# Patient Record
Sex: MALE | Race: WHITE | NOT HISPANIC OR LATINO | URBAN - METROPOLITAN AREA
[De-identification: names, ages, dates, MRNs, and addresses within clinical notes are randomized per-mention and may not be internally consistent; named-entity substitution may affect disease eponyms.]

---

## 2023-10-18 DIAGNOSIS — Z00.00 ROUTINE MEDICAL EXAM: Primary | ICD-10-CM

## 2023-11-10 ENCOUNTER — OFFICE VISIT (OUTPATIENT)
Dept: INTERNAL MEDICINE | Facility: CLINIC | Age: 23
End: 2023-11-10
Payer: COMMERCIAL

## 2023-11-10 ENCOUNTER — HOSPITAL ENCOUNTER (OUTPATIENT)
Dept: CARDIOLOGY | Facility: CLINIC | Age: 23
Discharge: HOME | End: 2023-11-10
Payer: COMMERCIAL

## 2023-11-10 ENCOUNTER — CLINICAL SUPPORT (OUTPATIENT)
Dept: INTERNAL MEDICINE | Facility: CLINIC | Age: 23
End: 2023-11-10
Payer: COMMERCIAL

## 2023-11-10 ENCOUNTER — HOSPITAL ENCOUNTER (OUTPATIENT)
Dept: RADIOLOGY | Facility: HOSPITAL | Age: 23
Discharge: HOME | End: 2023-11-10
Attending: STUDENT IN AN ORGANIZED HEALTH CARE EDUCATION/TRAINING PROGRAM
Payer: COMMERCIAL

## 2023-11-10 VITALS
WEIGHT: 141.56 LBS | SYSTOLIC BLOOD PRESSURE: 116 MMHG | OXYGEN SATURATION: 97 % | HEART RATE: 62 BPM | RESPIRATION RATE: 18 BRPM | DIASTOLIC BLOOD PRESSURE: 62 MMHG

## 2023-11-10 DIAGNOSIS — F41.1 GENERALIZED ANXIETY DISORDER: ICD-10-CM

## 2023-11-10 DIAGNOSIS — F41.9 ANXIETY: ICD-10-CM

## 2023-11-10 DIAGNOSIS — Z23 NEED FOR VACCINATION: ICD-10-CM

## 2023-11-10 DIAGNOSIS — Z00.00 ROUTINE MEDICAL EXAM: ICD-10-CM

## 2023-11-10 DIAGNOSIS — Z00.00 ANNUAL PHYSICAL EXAM: Primary | ICD-10-CM

## 2023-11-10 DIAGNOSIS — M41.9 SCOLIOSIS OF CERVICOTHORACIC SPINE, UNSPECIFIED SCOLIOSIS TYPE: ICD-10-CM

## 2023-11-10 DIAGNOSIS — Z00.00 HEALTH MAINTENANCE EXAMINATION: ICD-10-CM

## 2023-11-10 DIAGNOSIS — Z00.00 ROUTINE GENERAL MEDICAL EXAMINATION AT A HEALTH CARE FACILITY: Primary | ICD-10-CM

## 2023-11-10 LAB
ALBUMIN SERPL BCP-MCNC: 4.4 G/DL (ref 3.5–5.2)
ALP SERPL-CCNC: 64 U/L (ref 55–135)
ALT SERPL W/O P-5'-P-CCNC: 16 U/L (ref 10–44)
ANION GAP SERPL CALC-SCNC: 11 MMOL/L (ref 8–16)
AST SERPL-CCNC: 14 U/L (ref 10–40)
BILIRUB SERPL-MCNC: 0.9 MG/DL (ref 0.1–1)
BUN SERPL-MCNC: 23 MG/DL (ref 6–20)
CALCIUM SERPL-MCNC: 9.8 MG/DL (ref 8.7–10.5)
CHLORIDE SERPL-SCNC: 104 MMOL/L (ref 95–110)
CHOLEST SERPL-MCNC: 149 MG/DL (ref 120–199)
CHOLEST/HDLC SERPL: 2.5 {RATIO} (ref 2–5)
CO2 SERPL-SCNC: 25 MMOL/L (ref 23–29)
CREAT SERPL-MCNC: 1.2 MG/DL (ref 0.5–1.4)
ERYTHROCYTE [DISTWIDTH] IN BLOOD BY AUTOMATED COUNT: 11.4 % (ref 11.5–14.5)
EST. GFR  (NO RACE VARIABLE): >60 ML/MIN/1.73 M^2
ESTIMATED AVG GLUCOSE: 91 MG/DL (ref 68–131)
GLUCOSE SERPL-MCNC: 83 MG/DL (ref 70–110)
HBA1C MFR BLD: 4.8 % (ref 4–5.6)
HCT VFR BLD AUTO: 44.2 % (ref 40–54)
HCV AB SERPL QL IA: NORMAL
HDLC SERPL-MCNC: 60 MG/DL (ref 40–75)
HDLC SERPL: 40.3 % (ref 20–50)
HGB BLD-MCNC: 15.4 G/DL (ref 14–18)
HIV 1+2 AB+HIV1 P24 AG SERPL QL IA: NORMAL
LDLC SERPL CALC-MCNC: 76.2 MG/DL (ref 63–159)
MCH RBC QN AUTO: 32.2 PG (ref 27–31)
MCHC RBC AUTO-ENTMCNC: 34.8 G/DL (ref 32–36)
MCV RBC AUTO: 93 FL (ref 82–98)
NONHDLC SERPL-MCNC: 89 MG/DL
PLATELET # BLD AUTO: 279 K/UL (ref 150–450)
PMV BLD AUTO: 9.7 FL (ref 9.2–12.9)
POTASSIUM SERPL-SCNC: 4.3 MMOL/L (ref 3.5–5.1)
PROT SERPL-MCNC: 7.7 G/DL (ref 6–8.4)
RBC # BLD AUTO: 4.78 M/UL (ref 4.6–6.2)
SODIUM SERPL-SCNC: 140 MMOL/L (ref 136–145)
TRIGL SERPL-MCNC: 64 MG/DL (ref 30–150)
TSH SERPL DL<=0.005 MIU/L-ACNC: 0.78 UIU/ML (ref 0.4–4)
WBC # BLD AUTO: 5.37 K/UL (ref 3.9–12.7)

## 2023-11-10 PROCEDURE — 3044F HG A1C LEVEL LT 7.0%: CPT | Mod: CPTII,S$GLB,, | Performed by: STUDENT IN AN ORGANIZED HEALTH CARE EDUCATION/TRAINING PROGRAM

## 2023-11-10 PROCEDURE — 1159F PR MEDICATION LIST DOCUMENTED IN MEDICAL RECORD: ICD-10-PCS | Mod: CPTII,S$GLB,, | Performed by: STUDENT IN AN ORGANIZED HEALTH CARE EDUCATION/TRAINING PROGRAM

## 2023-11-10 PROCEDURE — 85027 COMPLETE CBC AUTOMATED: CPT | Performed by: STUDENT IN AN ORGANIZED HEALTH CARE EDUCATION/TRAINING PROGRAM

## 2023-11-10 PROCEDURE — 3078F DIAST BP <80 MM HG: CPT | Mod: CPTII,S$GLB,, | Performed by: STUDENT IN AN ORGANIZED HEALTH CARE EDUCATION/TRAINING PROGRAM

## 2023-11-10 PROCEDURE — 71046 XR CHEST PA AND LATERAL: ICD-10-PCS | Mod: 26,,, | Performed by: RADIOLOGY

## 2023-11-10 PROCEDURE — 99999 PR PBB SHADOW E&M-EST. PATIENT-LVL IV: CPT | Mod: PBBFAC,,, | Performed by: STUDENT IN AN ORGANIZED HEALTH CARE EDUCATION/TRAINING PROGRAM

## 2023-11-10 PROCEDURE — 99385 PR PREVENTIVE VISIT,NEW,18-39: ICD-10-PCS | Mod: S$GLB,,, | Performed by: STUDENT IN AN ORGANIZED HEALTH CARE EDUCATION/TRAINING PROGRAM

## 2023-11-10 PROCEDURE — 3074F SYST BP LT 130 MM HG: CPT | Mod: CPTII,S$GLB,, | Performed by: STUDENT IN AN ORGANIZED HEALTH CARE EDUCATION/TRAINING PROGRAM

## 2023-11-10 PROCEDURE — 99385 PREV VISIT NEW AGE 18-39: CPT | Mod: S$GLB,,, | Performed by: STUDENT IN AN ORGANIZED HEALTH CARE EDUCATION/TRAINING PROGRAM

## 2023-11-10 PROCEDURE — 93010 EKG 12-LEAD: ICD-10-PCS | Mod: S$GLB,,, | Performed by: INTERNAL MEDICINE

## 2023-11-10 PROCEDURE — 80053 COMPREHEN METABOLIC PANEL: CPT | Performed by: STUDENT IN AN ORGANIZED HEALTH CARE EDUCATION/TRAINING PROGRAM

## 2023-11-10 PROCEDURE — 99999 PR PBB SHADOW E&M-EST. PATIENT-LVL I: CPT | Mod: PBBFAC,,,

## 2023-11-10 PROCEDURE — 80061 LIPID PANEL: CPT | Performed by: STUDENT IN AN ORGANIZED HEALTH CARE EDUCATION/TRAINING PROGRAM

## 2023-11-10 PROCEDURE — 93005 ELECTROCARDIOGRAM TRACING: CPT | Mod: S$GLB,,, | Performed by: STUDENT IN AN ORGANIZED HEALTH CARE EDUCATION/TRAINING PROGRAM

## 2023-11-10 PROCEDURE — 93010 ELECTROCARDIOGRAM REPORT: CPT | Mod: S$GLB,,, | Performed by: INTERNAL MEDICINE

## 2023-11-10 PROCEDURE — 83036 HEMOGLOBIN GLYCOSYLATED A1C: CPT | Performed by: STUDENT IN AN ORGANIZED HEALTH CARE EDUCATION/TRAINING PROGRAM

## 2023-11-10 PROCEDURE — 71046 X-RAY EXAM CHEST 2 VIEWS: CPT | Mod: 26,,, | Performed by: RADIOLOGY

## 2023-11-10 PROCEDURE — 36415 COLL VENOUS BLD VENIPUNCTURE: CPT | Performed by: STUDENT IN AN ORGANIZED HEALTH CARE EDUCATION/TRAINING PROGRAM

## 2023-11-10 PROCEDURE — 99999 PR PBB SHADOW E&M-EST. PATIENT-LVL IV: ICD-10-PCS | Mod: PBBFAC,,, | Performed by: STUDENT IN AN ORGANIZED HEALTH CARE EDUCATION/TRAINING PROGRAM

## 2023-11-10 PROCEDURE — 86803 HEPATITIS C AB TEST: CPT | Performed by: STUDENT IN AN ORGANIZED HEALTH CARE EDUCATION/TRAINING PROGRAM

## 2023-11-10 PROCEDURE — 84443 ASSAY THYROID STIM HORMONE: CPT | Performed by: STUDENT IN AN ORGANIZED HEALTH CARE EDUCATION/TRAINING PROGRAM

## 2023-11-10 PROCEDURE — 3078F PR MOST RECENT DIASTOLIC BLOOD PRESSURE < 80 MM HG: ICD-10-PCS | Mod: CPTII,S$GLB,, | Performed by: STUDENT IN AN ORGANIZED HEALTH CARE EDUCATION/TRAINING PROGRAM

## 2023-11-10 PROCEDURE — 1159F MED LIST DOCD IN RCRD: CPT | Mod: CPTII,S$GLB,, | Performed by: STUDENT IN AN ORGANIZED HEALTH CARE EDUCATION/TRAINING PROGRAM

## 2023-11-10 PROCEDURE — 87389 HIV-1 AG W/HIV-1&-2 AB AG IA: CPT | Performed by: STUDENT IN AN ORGANIZED HEALTH CARE EDUCATION/TRAINING PROGRAM

## 2023-11-10 PROCEDURE — 71046 X-RAY EXAM CHEST 2 VIEWS: CPT | Mod: TC

## 2023-11-10 PROCEDURE — 3044F PR MOST RECENT HEMOGLOBIN A1C LEVEL <7.0%: ICD-10-PCS | Mod: CPTII,S$GLB,, | Performed by: STUDENT IN AN ORGANIZED HEALTH CARE EDUCATION/TRAINING PROGRAM

## 2023-11-10 PROCEDURE — 3074F PR MOST RECENT SYSTOLIC BLOOD PRESSURE < 130 MM HG: ICD-10-PCS | Mod: CPTII,S$GLB,, | Performed by: STUDENT IN AN ORGANIZED HEALTH CARE EDUCATION/TRAINING PROGRAM

## 2023-11-10 PROCEDURE — 93005 EKG 12-LEAD: ICD-10-PCS | Mod: S$GLB,,, | Performed by: STUDENT IN AN ORGANIZED HEALTH CARE EDUCATION/TRAINING PROGRAM

## 2023-11-10 PROCEDURE — 99999 PR PBB SHADOW E&M-EST. PATIENT-LVL I: ICD-10-PCS | Mod: PBBFAC,,,

## 2023-11-10 NOTE — LETTER
November 10, 2023    Vivek Fletcher  [address]  [facility] [address]             [facility]  [address]  [facility] [address]  Phone: [phone]  Fax: [phone] Dear Mr. Fletcher:      It was a pleasure seeing you in clinic for your Executive Health exam on 11/10/2023. Enclosed is a copy of the clinic note detailing the history, physical exam findings, laboratory studies, and recommendations at that time. Thank you for allowing me to participate in your medical care.             If you have any questions or concerns, please don't hesitate to call.    Sincerely,        Yamile Ivy MD      Quality 226: Preventive Care And Screening: Tobacco Use: Screening And Cessation Intervention: Patient screened for tobacco and never smoked Quality 130: Documentation Of Current Medications In The Medical Record: Current Medications Documented Quality 110: Preventive Care And Screening: Influenza Immunization: Influenza Immunization previously received during influenza season Detail Level: Detailed

## 2023-11-10 NOTE — PROGRESS NOTES
[facility] EXECUTIVE HEALTH EXAM    CHIEF COMPLAINT: Executive health exam    HISTORY OF PRESENT ILLNESS: Vivek Fletcher is a 23 y.o. male who presents here today for an executive health examination. Patient is an employee of [employer] - he just started 2 months ago as an . He just moved here from [place]. Patient has no chronic medical conditions and denies any acute concerns today.     PAST MEDICAL HISTORY:  No past medical history on file.    MEDICATIONS:    Current Outpatient Medications:     diphth,pertus,acell,,tetanus (BOOSTRIX TDAP) 2.5-8-5 Lf-mcg-Lf/0.5mL Syrg injection, Inject 0.5 mLs into the muscle once. for 1 dose, Disp: 0.5 mL, Rfl: 0    flu vacc [id number] 6mos up,PF, (FLUARIX QUAD [id number], PF,) 60 mcg (15 mcg x 4)/0.5 mL Syrg, Inject 0.5 mLs into the muscle once. for 1 dose, Disp: 0.5 mL, Rfl: 0    hpv vaccine,9-kendra (GARDASIL 9, PF,) 0.5 mL Syrg, into the muscle as directed, Disp: 0.5 mL, Rfl: 2    PAST SURGICAL HISTORY:  No past surgical history on file.    SOCIAL HISTORY:  Social History     Tobacco Use    Smoking status: Never    Smokeless tobacco: Never    Tobacco comments:     E-cigarette daily   Substance Use Topics    Alcohol use: Yes     Alcohol/week: 6.0 standard drinks of alcohol     Types: 6 Drinks containing 0.5 oz of alcohol per week    Drug use: Not Currently     Frequency: 2.0 times per week     Types: Marijuana     Comment: Very infrequently 2023 onward       ALLERGIES:  Review of patient's allergies indicates:  No Known Allergies    FAMILY HISTORY:  Family History   Problem Relation Age of Onset    Drug abuse Mother     No Known Problems Father     No Known Problems Sister     No Known Problems Brother     No Known Problems Brother     No Known Problems Maternal Grandmother     Alcohol abuse Maternal Grandfather     No Known Problems Paternal Grandmother     No Known Problems Paternal Grandfather        PREVENTATIVE HEALTH:     IMMUNIZATIONS:                                 "                              Influenza: requested  COVID: yes x 2, no booster  Tdap: none on file; requested  HPV: requested  Shingles: not indicated  Pneumonia: not indicated    SCREENINGS        Prostate cancer: not indicated  Colon cancer: not indicated  Lung cancer: not indicated  HIV: negative today  Hepatitis C: negative today  STI: declined  Diabetes: A1c today  Lipids: Lipid panel today  AAA: not indicated  Bone density: not indicated  Anxiety and depression:  reports he has been dealing with anxiety lately and would be interested in speaking with a therapist    LIFESTYLE         Exercise: Yes, about 3 days per week - goes to gym  Diet: "Could be better"  Substance use: as above  Employment:  at [employer]  Family & living situation: Lives in [place], apartment    INTERVENTIONS        Statin: not indicated  Aspirin: not indicated      PHYSICAL EXAM:    /62 (BP Location: Right arm, Patient Position: Sitting, BP Method: Medium (Manual))   Pulse 62   Resp 18   Wt 64.2 kg (141 lb 8.6 oz)   SpO2 97%     Physical Exam  Vitals and nursing note reviewed.   Constitutional:       General: He is not in acute distress.     Appearance: Normal appearance. He is not ill-appearing, toxic-appearing or diaphoretic.   HENT:      Head: Normocephalic and atraumatic.      Right Ear: Tympanic membrane, ear canal and external ear normal.      Left Ear: Tympanic membrane, ear canal and external ear normal.      Nose: Nose normal.      Mouth/Throat:      Mouth: Mucous membranes are moist.      Pharynx: Oropharynx is clear. No oropharyngeal exudate.   Eyes:      Extraocular Movements: Extraocular movements intact.      Conjunctiva/sclera: Conjunctivae normal.      Pupils: Pupils are equal, round, and reactive to light.   Cardiovascular:      Rate and Rhythm: Normal rate and regular rhythm.      Heart sounds: Normal heart sounds. No murmur heard.  Pulmonary:      Effort: Pulmonary effort is normal. No respiratory " distress.      Breath sounds: Normal breath sounds. No wheezing.   Musculoskeletal:         General: No deformity. Normal range of motion.      Cervical back: Neck supple. No tenderness.   Lymphadenopathy:      Cervical: No cervical adenopathy.   Skin:     Findings: No lesion or rash.   Neurological:      General: No focal deficit present.      Mental Status: He is alert.      Motor: No weakness.      Gait: Gait normal.   Psychiatric:         Mood and Affect: Mood normal.         Behavior: Behavior normal.         Thought Content: Thought content normal.         Judgment: Judgment normal.            LAB REVIEW:    Lab Results   Component Value Date     11/10/2023    K 4.3 11/10/2023     11/10/2023    CO2 25 11/10/2023    BUN 23 (H) 11/10/2023    CREATININE 1.2 11/10/2023    CALCIUM 9.8 11/10/2023    ANIONGAP 11 11/10/2023    EGFRNORACEVR >60.0 11/10/2023       Lab Results   Component Value Date    ALT 16 11/10/2023    AST 14 11/10/2023    ALKPHOS 64 11/10/2023    BILITOT 0.9 11/10/2023       Lab Results   Component Value Date    WBC 5.37 11/10/2023    HGB 15.4 11/10/2023    HCT 44.2 11/10/2023    MCV 93 11/10/2023     11/10/2023       Lab Results   Component Value Date    TSH 0.776 11/10/2023       Lab Results   Component Value Date    HGBA1C 4.8 11/10/2023       Cholesterol   Date Value Ref Range Status   11/10/2023 149 120 - 199 mg/dL Final     Comment:     The National Cholesterol Education Program (NCEP) has set the  following guidelines (reference ranges) for Cholesterol:  Optimal.....................<200 mg/dL  Borderline High.............200-239 mg/dL  High........................> or = 240 mg/dL       HDL   Date Value Ref Range Status   11/10/2023 60 40 - 75 mg/dL Final     Comment:     The National Cholesterol Education Program (NCEP) has set the  following guidelines (reference values) for HDL Cholesterol:  Low...............<40 mg/dL  Optimal...........>60 mg/dL       LDL Cholesterol    Date Value Ref Range Status   11/10/2023 76.2 63.0 - 159.0 mg/dL Final     Comment:     The National Cholesterol Education Program (NCEP) has set the  following guidelines (reference values) for LDL Cholesterol:  Optimal.......................<130 mg/dL  Borderline High...............130-159 mg/dL  High..........................160-189 mg/dL  Very High.....................>190 mg/dL       Triglycerides   Date Value Ref Range Status   11/10/2023 64 30 - 150 mg/dL Final     Comment:     The National Cholesterol Education Program (NCEP) has set the  following guidelines (reference values) for triglycerides:  Normal......................<150 mg/dL  Borderline High.............150-199 mg/dL  High........................200-499 mg/dL         Lab Results   Component Value Date    [id number] Non-reactive 11/10/2023       Lab Results   Component Value Date    HEPCAB Non-reactive 11/10/2023         IMAGING:    CHEST X-RAY    X-Ray Chest PA And Lateral    Result Date: 11/10/2023    FINDINGS:  Heart size normal.  The lungs are clear.  No pleural effusion.  Accessory azygous fissure noted the right side    Electronically signed by: Dao Cha MD   Date:    11/10/2023   Time:    12:39       EKG    Results for orders placed or performed during the hospital encounter of 11/10/23   EKG 12-lead    Collection Time: 11/10/23  9:29 AM    Narrative    Test Reason : Z00.00,    Vent. Rate : 075 BPM     Atrial Rate : 075 BPM     P-R Int : 140 ms          QRS Dur : 092 ms      QT Int : 374 ms       P-R-T Axes : 077 079 060 degrees     QTc Int : 417 ms    Normal sinus rhythm with sinus arrhythmia  Possible Right atrial enlargement  Borderline Abnormal ECG  No previous ECGs available    Reconfirmed by Michael Salazar MD ([id number]) on 11/10/2023 11:04:17 AM    Referred By: BUSHRA MCNULTY           Confirmed By:Michael Salazar MD           ASSESSMENT & PLAN:    Vivek was seen today for [facility] health.    Diagnoses and all orders for this  visit:    Annual physical exam  Health maintenance examination  Health screenings and immunizations due as below  History, physical exam findings, imaging results, and lab results are all acceptable with exception of what is detailed below    Generalized anxiety disorder  Patient reporting some recent anxiety and is interested in seeing a therapist - referral placed.  -     Ambulatory referral/consult to Behavioral Health; Future; Expected date: 11/17/2023    Scoliosis of cervicothoracic spine, unspecified scoliosis type  Diagnosed as a child. Noted on XR and exam. No symptoms.   Further workup if symptoms develop.    Need for vaccination  Tdap, influenza, and HPV vaccine(s) ordered to be completed today        Return to clinic in 12 months or sooner as needed.    Yamile Ivy MD  [facility]  11/10/2023

## 2023-11-10 NOTE — PATIENT INSTRUCTIONS
Referrals as detailed below have been ordered. You may schedule these appointments when you check out today.     The influenza, Tdap, and HPV vaccines have been ordered which you will have completed today.

## 2024-02-19 DIAGNOSIS — Z00.00 ROUTINE GENERAL MEDICAL EXAMINATION AT A HEALTH CARE FACILITY: Primary | ICD-10-CM

## 2024-03-28 ENCOUNTER — HOSPITAL ENCOUNTER (OUTPATIENT)
Dept: CARDIOLOGY | Facility: CLINIC | Age: 24
Discharge: HOME | End: 2024-03-28
Payer: COMMERCIAL

## 2024-03-28 ENCOUNTER — CLINICAL SUPPORT (OUTPATIENT)
Dept: INTERNAL MEDICINE | Facility: CLINIC | Age: 24
End: 2024-03-28
Payer: COMMERCIAL

## 2024-03-28 ENCOUNTER — OFFICE VISIT (OUTPATIENT)
Dept: INTERNAL MEDICINE | Facility: CLINIC | Age: 24
End: 2024-03-28
Payer: COMMERCIAL

## 2024-03-28 VITALS
DIASTOLIC BLOOD PRESSURE: 68 MMHG | SYSTOLIC BLOOD PRESSURE: 114 MMHG | WEIGHT: 151.25 LBS | OXYGEN SATURATION: 98 % | HEART RATE: 81 BPM | HEIGHT: 71 IN | BODY MASS INDEX: 21.18 KG/M2

## 2024-03-28 DIAGNOSIS — I51.7 RIGHT ATRIAL ENLARGEMENT: ICD-10-CM

## 2024-03-28 DIAGNOSIS — Z00.00 ROUTINE GENERAL MEDICAL EXAMINATION AT A HEALTH CARE FACILITY: Primary | ICD-10-CM

## 2024-03-28 DIAGNOSIS — Z00.00 ROUTINE GENERAL MEDICAL EXAMINATION AT A HEALTH CARE FACILITY: ICD-10-CM

## 2024-03-28 DIAGNOSIS — Z00.00 ANNUAL PHYSICAL EXAM: Primary | ICD-10-CM

## 2024-03-28 DIAGNOSIS — Z00.00 HEALTH MAINTENANCE EXAMINATION: ICD-10-CM

## 2024-03-28 DIAGNOSIS — F41.9 ANXIETY: ICD-10-CM

## 2024-03-28 PROBLEM — M41.9 SCOLIOSIS: Status: RESOLVED | Noted: 2023-11-10 | Resolved: 2024-03-28

## 2024-03-28 LAB
ALBUMIN SERPL BCP-MCNC: 4.3 G/DL (ref 3.5–5.2)
ALP SERPL-CCNC: 62 U/L (ref 55–135)
ALT SERPL W/O P-5'-P-CCNC: 32 U/L (ref 10–44)
ANION GAP SERPL CALC-SCNC: 13 MMOL/L (ref 8–16)
AST SERPL-CCNC: 18 U/L (ref 10–40)
BILIRUB SERPL-MCNC: 0.6 MG/DL (ref 0.1–1)
BUN SERPL-MCNC: 20 MG/DL (ref 6–20)
CALCIUM SERPL-MCNC: 10.1 MG/DL (ref 8.7–10.5)
CHLORIDE SERPL-SCNC: 106 MMOL/L (ref 95–110)
CHOLEST SERPL-MCNC: 167 MG/DL (ref 120–199)
CHOLEST/HDLC SERPL: 2.3 {RATIO} (ref 2–5)
CO2 SERPL-SCNC: 23 MMOL/L (ref 23–29)
CREAT SERPL-MCNC: 1.2 MG/DL (ref 0.5–1.4)
ERYTHROCYTE [DISTWIDTH] IN BLOOD BY AUTOMATED COUNT: 11.7 % (ref 11.5–14.5)
EST. GFR  (NO RACE VARIABLE): >60 ML/MIN/1.73 M^2
ESTIMATED AVG GLUCOSE: 91 MG/DL (ref 68–131)
GLUCOSE SERPL-MCNC: 84 MG/DL (ref 70–110)
HBA1C MFR BLD: 4.8 % (ref 4–5.6)
HCT VFR BLD AUTO: 46.5 % (ref 40–54)
HDLC SERPL-MCNC: 73 MG/DL (ref 40–75)
HDLC SERPL: 43.7 % (ref 20–50)
HGB BLD-MCNC: 15.6 G/DL (ref 14–18)
LDLC SERPL CALC-MCNC: 80.6 MG/DL (ref 63–159)
MCH RBC QN AUTO: 32.5 PG (ref 27–31)
MCHC RBC AUTO-ENTMCNC: 33.5 G/DL (ref 32–36)
MCV RBC AUTO: 97 FL (ref 82–98)
NONHDLC SERPL-MCNC: 94 MG/DL
OHS QRS DURATION: 96 MS
OHS QTC CALCULATION: 418 MS
PLATELET # BLD AUTO: 305 K/UL (ref 150–450)
PMV BLD AUTO: 9.5 FL (ref 9.2–12.9)
POTASSIUM SERPL-SCNC: 4.2 MMOL/L (ref 3.5–5.1)
PROT SERPL-MCNC: 7.7 G/DL (ref 6–8.4)
RBC # BLD AUTO: 4.8 M/UL (ref 4.6–6.2)
SODIUM SERPL-SCNC: 142 MMOL/L (ref 136–145)
TRIGL SERPL-MCNC: 67 MG/DL (ref 30–150)
TSH SERPL DL<=0.005 MIU/L-ACNC: 0.75 UIU/ML (ref 0.4–4)
WBC # BLD AUTO: 9.08 K/UL (ref 3.9–12.7)

## 2024-03-28 PROCEDURE — 36415 COLL VENOUS BLD VENIPUNCTURE: CPT | Performed by: STUDENT IN AN ORGANIZED HEALTH CARE EDUCATION/TRAINING PROGRAM

## 2024-03-28 PROCEDURE — 80061 LIPID PANEL: CPT | Performed by: STUDENT IN AN ORGANIZED HEALTH CARE EDUCATION/TRAINING PROGRAM

## 2024-03-28 PROCEDURE — 99999 PR PBB SHADOW E&M-EST. PATIENT-LVL I: CPT | Mod: PBBFAC,,,

## 2024-03-28 PROCEDURE — 1159F MED LIST DOCD IN RCRD: CPT | Mod: CPTII,S$GLB,, | Performed by: STUDENT IN AN ORGANIZED HEALTH CARE EDUCATION/TRAINING PROGRAM

## 2024-03-28 PROCEDURE — 3044F HG A1C LEVEL LT 7.0%: CPT | Mod: CPTII,S$GLB,, | Performed by: STUDENT IN AN ORGANIZED HEALTH CARE EDUCATION/TRAINING PROGRAM

## 2024-03-28 PROCEDURE — 97802 MEDICAL NUTRITION INDIV IN: CPT | Mod: S$GLB,,, | Performed by: DIETITIAN, REGISTERED

## 2024-03-28 PROCEDURE — 1160F RVW MEDS BY RX/DR IN RCRD: CPT | Mod: CPTII,S$GLB,, | Performed by: STUDENT IN AN ORGANIZED HEALTH CARE EDUCATION/TRAINING PROGRAM

## 2024-03-28 PROCEDURE — 83036 HEMOGLOBIN GLYCOSYLATED A1C: CPT | Performed by: STUDENT IN AN ORGANIZED HEALTH CARE EDUCATION/TRAINING PROGRAM

## 2024-03-28 PROCEDURE — 99999 PR PBB SHADOW E&M-EST. PATIENT-LVL III: CPT | Mod: PBBFAC,,, | Performed by: STUDENT IN AN ORGANIZED HEALTH CARE EDUCATION/TRAINING PROGRAM

## 2024-03-28 PROCEDURE — 93010 ELECTROCARDIOGRAM REPORT: CPT | Mod: S$GLB,,, | Performed by: INTERNAL MEDICINE

## 2024-03-28 PROCEDURE — 93005 ELECTROCARDIOGRAM TRACING: CPT | Mod: S$GLB,,, | Performed by: STUDENT IN AN ORGANIZED HEALTH CARE EDUCATION/TRAINING PROGRAM

## 2024-03-28 PROCEDURE — 99395 PREV VISIT EST AGE 18-39: CPT | Mod: S$GLB,,, | Performed by: STUDENT IN AN ORGANIZED HEALTH CARE EDUCATION/TRAINING PROGRAM

## 2024-03-28 PROCEDURE — 85027 COMPLETE CBC AUTOMATED: CPT | Performed by: STUDENT IN AN ORGANIZED HEALTH CARE EDUCATION/TRAINING PROGRAM

## 2024-03-28 PROCEDURE — 3074F SYST BP LT 130 MM HG: CPT | Mod: CPTII,S$GLB,, | Performed by: STUDENT IN AN ORGANIZED HEALTH CARE EDUCATION/TRAINING PROGRAM

## 2024-03-28 PROCEDURE — 80053 COMPREHEN METABOLIC PANEL: CPT | Performed by: STUDENT IN AN ORGANIZED HEALTH CARE EDUCATION/TRAINING PROGRAM

## 2024-03-28 PROCEDURE — 3008F BODY MASS INDEX DOCD: CPT | Mod: CPTII,S$GLB,, | Performed by: STUDENT IN AN ORGANIZED HEALTH CARE EDUCATION/TRAINING PROGRAM

## 2024-03-28 PROCEDURE — 84443 ASSAY THYROID STIM HORMONE: CPT | Performed by: STUDENT IN AN ORGANIZED HEALTH CARE EDUCATION/TRAINING PROGRAM

## 2024-03-28 PROCEDURE — 3078F DIAST BP <80 MM HG: CPT | Mod: CPTII,S$GLB,, | Performed by: STUDENT IN AN ORGANIZED HEALTH CARE EDUCATION/TRAINING PROGRAM

## 2024-03-28 NOTE — PROGRESS NOTES
[facility] EXECUTIVE HEALTH EXAM      CHIEF COMPLAINT: Executive health exam      HISTORY OF PRESENT ILLNESS: Vivek Fletcher is a 23 y.o. male who presents here today for an executive health examination. Patient is an employee of [employer]. Patient denies any acute concerns today.      PAST MEDICAL HISTORY:  History reviewed. No pertinent past medical history.      MEDICATIONS:  None.      PAST SURGICAL HISTORY:  History reviewed. No pertinent surgical history.      SOCIAL HISTORY:  Social History     Tobacco Use    Smoking status: Never    Smokeless tobacco: Never    Tobacco comments:     E-cigarette daily   Substance Use Topics    Alcohol use: Yes     Alcohol/week: 6.0 standard drinks of alcohol     Types: 6 Drinks containing 0.5 oz of alcohol per week    Drug use: Not Currently     Frequency: 2.0 times per week     Types: Marijuana     Comment: Very infrequently 2023 onward       ALLERGIES:  Review of patient's allergies indicates:  No Known Allergies      FAMILY HISTORY:  Family History   Problem Relation Age of Onset    Drug abuse Mother     No Known Problems Father     No Known Problems Sister     No Known Problems Brother     No Known Problems Brother     No Known Problems Maternal Grandmother     Alcohol abuse Maternal Grandfather     No Known Problems Paternal Grandmother     No Known Problems Paternal Grandfather        PREVENTATIVE HEALTH:     IMMUNIZATIONS:                                                              Influenza: not indicated  COVID: yes in [place]  Tdap: last in 2023, updated not indicated  HPV: declined  Shingles: not indicated  Pneumonia: not indicated    SCREENINGS        Prostate cancer: not indicated  Colon cancer: not indicated  Lung cancer: not indicated  HIV: negative 2023  Hepatitis C: negative 2023  STI: declined  Diabetes: A1c today  Lipids: Lipid panel today  AAA: not indicated  Bone density: not indicated  Anxiety and depression:  currently seeing therapist for anxiety and  "feels this is managed    LIFESTYLE         Exercise: yes, weight training  Diet: healthy - trying to put on weight  Substance use: as above  Employment: works as  with [employer]  Family & living situation: Lives in [place]    INTERVENTIONS        Statin: not indicated  Aspirin: not indicated      PHYSICAL EXAM:    /68 (BP Location: Left arm, Patient Position: Sitting)   Pulse 81   Ht 5' 11" (1.803 m)   Wt 68.6 kg (151 lb 3.8 oz)   SpO2 98%   BMI 21.09 kg/m²     Physical Exam  Vitals and nursing note reviewed.   Constitutional:       General: He is not in acute distress.     Appearance: Normal appearance. He is not ill-appearing, toxic-appearing or diaphoretic.   HENT:      Head: Normocephalic and atraumatic.      Right Ear: Tympanic membrane, ear canal and external ear normal.      Left Ear: Tympanic membrane, ear canal and external ear normal.      Nose: Nose normal.      Mouth/Throat:      Mouth: Mucous membranes are moist.      Pharynx: Oropharynx is clear. No oropharyngeal exudate.   Eyes:      Extraocular Movements: Extraocular movements intact.      Conjunctiva/sclera: Conjunctivae normal.      Pupils: Pupils are equal, round, and reactive to light.   Cardiovascular:      Rate and Rhythm: Normal rate and regular rhythm.      Heart sounds: Normal heart sounds. No murmur heard.  Pulmonary:      Effort: Pulmonary effort is normal. No respiratory distress.      Breath sounds: Normal breath sounds. No wheezing.   Musculoskeletal:         General: No deformity. Normal range of motion.      Cervical back: Neck supple. No tenderness.   Lymphadenopathy:      Cervical: No cervical adenopathy.   Skin:     Findings: No lesion or rash.   Neurological:      General: No focal deficit present.      Mental Status: He is alert.      Motor: No weakness.      Gait: Gait normal.   Psychiatric:         Mood and Affect: Mood normal.         Behavior: Behavior normal.         Thought Content: Thought content " normal.         Judgment: Judgment normal.            LAB REVIEW:    Lab Results   Component Value Date     03/28/2024    K 4.2 03/28/2024     03/28/2024    CO2 23 03/28/2024    BUN 20 03/28/2024    CREATININE 1.2 03/28/2024    CALCIUM 10.1 03/28/2024    ANIONGAP 13 03/28/2024    EGFRNORACEVR >60.0 03/28/2024       Lab Results   Component Value Date    ALT 32 03/28/2024    AST 18 03/28/2024    ALKPHOS 62 03/28/2024    BILITOT 0.6 03/28/2024       Lab Results   Component Value Date    WBC 9.08 03/28/2024    HGB 15.6 03/28/2024    HCT 46.5 03/28/2024    MCV 97 03/28/2024     03/28/2024       Lab Results   Component Value Date    TSH 0.754 03/28/2024       Lab Results   Component Value Date    HGBA1C 4.8 03/28/2024       Cholesterol   Date Value Ref Range Status   03/28/2024 167 120 - 199 mg/dL Final     Comment:     The National Cholesterol Education Program (NCEP) has set the  following guidelines (reference ranges) for Cholesterol:  Optimal.....................<200 mg/dL  Borderline High.............200-239 mg/dL  High........................> or = 240 mg/dL       HDL   Date Value Ref Range Status   03/28/2024 73 40 - 75 mg/dL Final     Comment:     The National Cholesterol Education Program (NCEP) has set the  following guidelines (reference values) for HDL Cholesterol:  Low...............<40 mg/dL  Optimal...........>60 mg/dL       LDL Cholesterol   Date Value Ref Range Status   03/28/2024 80.6 63.0 - 159.0 mg/dL Final     Comment:     The National Cholesterol Education Program (NCEP) has set the  following guidelines (reference values) for LDL Cholesterol:  Optimal.......................<130 mg/dL  Borderline High...............130-159 mg/dL  High..........................160-189 mg/dL  Very High.....................>190 mg/dL       Triglycerides   Date Value Ref Range Status   03/28/2024 67 30 - 150 mg/dL Final     Comment:     The National Cholesterol Education Program (NCEP) has set  the  following guidelines (reference values) for triglycerides:  Normal......................<150 mg/dL  Borderline High.............150-199 mg/dL  High........................200-499 mg/dL           IMAGING:    EKG    Results for orders placed or performed during the hospital encounter of 03/28/24   EKG 12-lead    Collection Time: 03/28/24  9:22 AM   Result Value Ref Range    QRS Duration 96 ms    [facility] QTC Calculation 418 ms    Narrative    Test Reason : Z00.00,    Vent. Rate : 087 BPM     Atrial Rate : 087 BPM     P-R Int : 132 ms          QRS Dur : 096 ms      QT Int : 348 ms       P-R-T Axes : 080 079 054 degrees     QTc Int : 418 ms    Normal sinus rhythm  Right atrial enlargement  Nonspecific ST abnormality  Abnormal ECG  When compared with ECG of 10-NOV-2023 09:29,  No significant change was found  Confirmed by DOREEN ARANGO MD ([id number]) on 3/28/2024 10:38:04 AM    Referred By: BUSHRA MCNULTY           Confirmed By:DOREEN ARANGO MD           ASSESSMENT & PLAN:    Vivek was seen today for annual exam.    Diagnoses and all orders for this visit:    Annual physical exam  Health maintenance examination  Health screenings and immunizations up to date  History, physical exam findings, imaging results, and lab results are all acceptable with exception of what is detailed below    Anxiety  Well controlled. Seeing therapist. Not interested in medication at this time.  Continue seeing therapist.    Right atrial enlargement  Noted on EKG Nov 2023 & again today. Asymptomatic.   Possibly false reading; underlying heart disease less likely. Blood pressure historically normal. No history of respiratory disease.  No further evaluation unless symptoms develop.            Bushra Mcnulty MD  [facility]  03/28/2024

## 2024-03-28 NOTE — PROGRESS NOTES
"Nutrition Assessment  Session Time:  60 minutes      Client name:  Vivek Fletcher  :  2000  Age:  23 y.o.  Gender:  male    Client states:  Very pleasant [employer] employee here for his annual Executive Health physical.  Joined [employer] 8 months ago and works hybrid role.  Has an unremarkable PMH and takes no routine rx medications or supplements daily with the exception of an occasional MVI.  Shares that he has maintained his weight since high school yet wishes to gain weight.  Thus, began strength training 4-5x/week and increased energy intake, specifically protein intake and thereafter, gained 15# over the past three months.  Wishes to continue gaining and has specific nutrition-related questions.  Has read conflicting information online and via social media.  Has been focused on overall kcal intake yet wonders of the importance of the type of calorie being consumed.  Formerly skipped meals yet currently consuming 2-3 meals in addition to snacks.  May have a protein shake following dinner despite lack of hunger then in an attempt to increase kcal and protein intake.  Considers his evening meal and HS snack to be highest in protein content in contrast to other meals.  Averages 2700 kcal daily in addition to ~100 gm or more of protein.  Drinks coffee during the day, sharing caffeine intake of >500 mg/day (coffee, workout-related supplements, etc.).  Notices he becomes "jittery" when consuming increased caffeine and so, inquired about recommended intake.  Overall, desires to continue gaining weight via dietary and P.A. modifications.     Anthropometrics  Height:  5' 11"     Weight:  147.9#  BMI:  20.6  % Body Fat:  11.7%    Clinical Signs/Symptoms  N/V/D:  None  Appetite:  good       No past medical history on file.    No past surgical history on file.    Medications    currently has no medications in their medication list.    Vitamins, Minerals, and/or Supplements:  MVI, creatine     Food/Medication Interactions:  " Reviewed     Food Allergies or Intolerances:  NKFA     Social History    Marital status:  Single  Employment:  [employer]     Social History     Tobacco Use    Smoking status: Never    Smokeless tobacco: Never    Tobacco comments:     E-cigarette daily   Substance Use Topics    Alcohol use: Yes     Alcohol/week: 6.0 standard drinks of alcohol     Types: 6 Drinks containing 0.5 oz of alcohol per week        Lab Reports   Sodium   Date Value Ref Range Status   03/28/2024 142 136 - 145 mmol/L Final     Potassium   Date Value Ref Range Status   03/28/2024 4.2 3.5 - 5.1 mmol/L Final     Chloride   Date Value Ref Range Status   03/28/2024 106 95 - 110 mmol/L Final     CO2   Date Value Ref Range Status   03/28/2024 23 23 - 29 mmol/L Final     Glucose   Date Value Ref Range Status   03/28/2024 84 70 - 110 mg/dL Final     BUN   Date Value Ref Range Status   03/28/2024 20 6 - 20 mg/dL Final     Creatinine   Date Value Ref Range Status   03/28/2024 1.2 0.5 - 1.4 mg/dL Final     Calcium   Date Value Ref Range Status   03/28/2024 10.1 8.7 - 10.5 mg/dL Final     Total Protein   Date Value Ref Range Status   03/28/2024 7.7 6.0 - 8.4 g/dL Final     Albumin   Date Value Ref Range Status   03/28/2024 4.3 3.5 - 5.2 g/dL Final     Total Bilirubin   Date Value Ref Range Status   03/28/2024 0.6 0.1 - 1.0 mg/dL Final     Comment:     For infants and newborns, interpretation of results should be based  on gestational age, weight and in agreement with clinical  observations.    Premature Infant recommended reference ranges:  Up to 24 hours.............<8.0 mg/dL  Up to 48 hours............<12.0 mg/dL  3-5 days..................<15.0 mg/dL  6-29 days.................<15.0 mg/dL       Alkaline Phosphatase   Date Value Ref Range Status   03/28/2024 62 55 - 135 U/L Final     AST   Date Value Ref Range Status   03/28/2024 18 10 - 40 U/L Final     ALT   Date Value Ref Range Status   03/28/2024 32 10 - 44 U/L Final     Anion Gap   Date Value Ref Range  Status   03/28/2024 13 8 - 16 mmol/L Final      Lab Results   Component Value Date    WBC 9.08 03/28/2024    HGB 15.6 03/28/2024    HCT 46.5 03/28/2024    MCV 97 03/28/2024     03/28/2024        Lab Results   Component Value Date    CHOL 167 03/28/2024     Lab Results   Component Value Date    HDL 73 03/28/2024     Lab Results   Component Value Date    LDLCALC 80.6 03/28/2024     Lab Results   Component Value Date    TRIG 67 03/28/2024     Lab Results   Component Value Date    CHOLHDL 43.7 03/28/2024     Lab Results   Component Value Date    HGBA1C 4.8 03/28/2024     BP Readings from Last 1 Encounters:   03/28/24 114/68       Food History  Breakfast:  2 eggs + 2 strips johnson + coffee/skips  Mid-morning Snack:  None  Lunch:  Restaurant meal  Mid-afternoon Snack:  None  Dinner:  Cheeseburger + salad  Snack:  Protein shake (2 scoops protein powder + water)  *Fluid intake:  Coffee, water    Exercise History:  Strength training activities 4-5x/week    Cultural/Spiritual/Personal Preferences:  None identified    Support System:  friends    State of Change:  Action    Barriers to Change:  None    Diagnosis    Food and nutrition related knowledge deficit related to inadequate nutrition education as evidenced by patient inquiry re:  general nutrition.    Intervention    RMR (Method:  InBody):  1649 kcal  Activity Factor:  1.4    BRIDGET:  2308 kcal    Goals:  1.  Achieve continued weight gain (patient's personal goal)  2.  Aim for 100-115 gm protein daily based on current body weight (Limit of 2 gm protein/kg body weight)  3.  Distribute protein intake evenly among meals and snacks (move night time protein shake to morning meal if skipping or mid-afternoon snack)  4.  Select mostly lean sources of protein and healthy, unsaturated fats  5.  Incorporate 1/2 plate of non-starchy vegetables 2x daily  6.  Reduce caffeine intake to < 500 mg/day    Nutrition Education  The following education was provided to the  patient:  Complimented patient on proactive role in health maintenance.  Complimented patient on dietary compliance/modifications and resulting health improvements.  Complimented patient on physical activity efforts.  Discussed meal planning/[employer] My Plate design.  Discussed weight management.  Discussed Heart Healthy Eating.  Suggested dietary modifications based on current dietary behaviors and individual food preferences.  Discussed macronutrient distribution among meals and snacks, including CHO, protein, and fat recommendations and its importance/benefits.  Discussed physical activity guidelines and its associated benefits.  *Lab results were pending at time of consult and so, not discussed with patient.  Discussed recommended servings of non-starchy vegetables/day and food sources of such.  Discussed vitamin/mineral recommendations (may include but not limited to MVI, Vitamin D, Calcium, and/or Iron) and associated food sources.  Discussed goal setting.  Discussed caffeine intake (recommended intake, potential health consequences of excessive caffeine, sources of caffeine, etc.).    Patient verbalized understanding of nutrition education and recommendations received.    Handouts Provided  Meal Planning Guide  Eat Fit Shopping List  Fueling Well On-The-Go    Monitoring/Evaluation    Monitor the following:  Weight  BMI  % Body Fat  Caloric intake    Follow Up Plan:  Communication with referring healthcare provider is unnecessary at this time as patient presented as part of annual wellness exam.  However, will follow up with patient in 1-2 years.

## 2024-03-28 NOTE — LETTER
March 28, 2024    Vivek Fletcher  [address]  [facility] [address]             [facility]  [address]  [place] [address]  Phone: [phone]  Fax: [phone] Dear Mr. Fletcher:      It was a pleasure seeing you in clinic for your Executive Health exam on 3/28/2024. Enclosed is a copy of the clinic note detailing the history, physical exam findings, laboratory studies, and recommendations at that time. Thank you for allowing me to participate in your medical care.             If you have any questions or concerns, please don't hesitate to call.    Sincerely,        Yamile Ivy MD

## 2024-03-28 NOTE — PROGRESS NOTES
Pt. Has no significant cardiovascular or pulmonary history.    Physical Limitations:  Patient has slight scoliosis from which he denied any physical limitations.      Current exercise routine:  Patient currently goes to the gym 4-5 days a week where he practices upper and lower body resistance training exercises and stretches prior to lifting.  Patient does not follow any formal aerobic exercise routine at the current time.    Goals:  Patient set a goal weight of 160-156 lbs.  Patient would like to continue to put on muscle mass and has a long term goal to eventually run a marathon.    Notes:  Patient was very friendly and engaged.  He started as an  for [employer] about 8 months ago.  He noted that he was 133 lbs at his most recent lowest weight and has put on about 15 lbs in the last 3 months from practicing regular resistance training and modifying his diet.  Patient is currently focused on building muscle mass but also noted that he would like to eventually start regularly running with the ultimate goal to complete a marathon.  We discussed how to add running to his routine without compromising building muscle.  Patient asked questions and was receptive to all recommendations.      The fitness evaluation results are as follows:    D.O.S. 3/28/2024   Height (in): 71   Weight (lbs): 147.9   BMI: 20.021696   Body Fat (%): 11.70   Waist (cm): 74   RBP (mmHg): 110/80   RHR (bpm): 68    Strength Dominant (Lbs): 127    Strength Non Dominant (Lbs): 125   Push-up Assessment: 26   Curl-up Assessment: 36   Flexibility Testing (cm): 38   REE (kcals): 1649       Age/gender stratified assessment:    Resting BP: Within Normal Limits   Body Fat %: Very Good   Waist Circumfernece: Low Risk    Strength Dominant: 90th    Strength Non Dominant: 90th   Upper Body Endurance: Good   Abdominal Endurance: Average   Lower body Flexibiltiy: Very Good       Recommended fitness guidelines:    -150 minutes of moderate  intensity aerobic exercise per week or 75 minutes of vigorous intensity aerobic exercise per week.   Try to practice vigorous intensity aerobic exercise for 15 minutes, 3 days a week.  To increase your running distance, run at a slower pace for longer distances.      -2 to 4 days per week of resistance training for each muscle group.   Practice core exercises a minimum of 2 days a week.      -Daily stretching with a hold of at least 30 seconds per muscle group.

## 2025-02-10 DIAGNOSIS — Z00.00 ROUTINE GENERAL MEDICAL EXAMINATION AT A HEALTH CARE FACILITY: Primary | ICD-10-CM

## 2025-03-10 ENCOUNTER — RESULTS FOLLOW-UP (OUTPATIENT)
Dept: INTERNAL MEDICINE | Facility: CLINIC | Age: 25
End: 2025-03-10

## 2025-03-10 ENCOUNTER — CLINICAL SUPPORT (OUTPATIENT)
Dept: INTERNAL MEDICINE | Facility: CLINIC | Age: 25
End: 2025-03-10
Payer: COMMERCIAL

## 2025-03-10 ENCOUNTER — HOSPITAL ENCOUNTER (OUTPATIENT)
Dept: CARDIOLOGY | Facility: CLINIC | Age: 25
Discharge: HOME | End: 2025-03-10
Payer: COMMERCIAL

## 2025-03-10 ENCOUNTER — OFFICE VISIT (OUTPATIENT)
Dept: INTERNAL MEDICINE | Facility: CLINIC | Age: 25
End: 2025-03-10
Payer: COMMERCIAL

## 2025-03-10 VITALS
BODY MASS INDEX: 21.89 KG/M2 | OXYGEN SATURATION: 98 % | HEART RATE: 76 BPM | DIASTOLIC BLOOD PRESSURE: 72 MMHG | SYSTOLIC BLOOD PRESSURE: 124 MMHG | WEIGHT: 156.94 LBS

## 2025-03-10 DIAGNOSIS — R94.31 ABNORMAL EKG: ICD-10-CM

## 2025-03-10 DIAGNOSIS — Z00.00 ROUTINE GENERAL MEDICAL EXAMINATION AT A HEALTH CARE FACILITY: Primary | ICD-10-CM

## 2025-03-10 DIAGNOSIS — F41.9 ANXIETY: ICD-10-CM

## 2025-03-10 DIAGNOSIS — Z00.00 ROUTINE GENERAL MEDICAL EXAMINATION AT A HEALTH CARE FACILITY: ICD-10-CM

## 2025-03-10 DIAGNOSIS — Z28.21 VACCINATION REFUSED BY PATIENT: ICD-10-CM

## 2025-03-10 DIAGNOSIS — Z00.00 ANNUAL PHYSICAL EXAM: Primary | ICD-10-CM

## 2025-03-10 LAB
ALBUMIN SERPL BCP-MCNC: 4.2 G/DL (ref 3.5–5.2)
ALP SERPL-CCNC: 66 U/L (ref 40–150)
ALT SERPL W/O P-5'-P-CCNC: 18 U/L (ref 10–44)
ANION GAP SERPL CALC-SCNC: 8 MMOL/L (ref 8–16)
AST SERPL-CCNC: 18 U/L (ref 10–40)
BILIRUB SERPL-MCNC: 1 MG/DL (ref 0.1–1)
BUN SERPL-MCNC: 20 MG/DL (ref 6–20)
CALCIUM SERPL-MCNC: 9.4 MG/DL (ref 8.7–10.5)
CHLORIDE SERPL-SCNC: 103 MMOL/L (ref 95–110)
CHOLEST SERPL-MCNC: 166 MG/DL (ref 120–199)
CHOLEST/HDLC SERPL: 2.4 {RATIO} (ref 2–5)
CO2 SERPL-SCNC: 28 MMOL/L (ref 23–29)
CREAT SERPL-MCNC: 1.2 MG/DL (ref 0.5–1.4)
ERYTHROCYTE [DISTWIDTH] IN BLOOD BY AUTOMATED COUNT: 11.5 % (ref 11.5–14.5)
EST. GFR  (NO RACE VARIABLE): >60 ML/MIN/1.73 M^2
ESTIMATED AVG GLUCOSE: 88 MG/DL (ref 68–131)
GLUCOSE SERPL-MCNC: 85 MG/DL (ref 70–110)
HBA1C MFR BLD: 4.7 % (ref 4–5.6)
HCT VFR BLD AUTO: 46 % (ref 40–54)
HDLC SERPL-MCNC: 69 MG/DL (ref 40–75)
HDLC SERPL: 41.6 % (ref 20–50)
HGB BLD-MCNC: 15.1 G/DL (ref 14–18)
LDLC SERPL CALC-MCNC: 75 MG/DL (ref 63–159)
MCH RBC QN AUTO: 31.8 PG (ref 27–31)
MCHC RBC AUTO-ENTMCNC: 32.8 G/DL (ref 32–36)
MCV RBC AUTO: 97 FL (ref 82–98)
NONHDLC SERPL-MCNC: 97 MG/DL
OHS QRS DURATION: 90 MS
OHS QTC CALCULATION: 432 MS
PLATELET # BLD AUTO: 326 K/UL (ref 150–450)
PMV BLD AUTO: 9.4 FL (ref 9.2–12.9)
POTASSIUM SERPL-SCNC: 3.5 MMOL/L (ref 3.5–5.1)
PROT SERPL-MCNC: 7.4 G/DL (ref 6–8.4)
RBC # BLD AUTO: 4.75 M/UL (ref 4.6–6.2)
SODIUM SERPL-SCNC: 139 MMOL/L (ref 136–145)
TRIGL SERPL-MCNC: 110 MG/DL (ref 30–150)
TSH SERPL DL<=0.005 MIU/L-ACNC: 1.55 UIU/ML (ref 0.4–4)
WBC # BLD AUTO: 7.79 K/UL (ref 3.9–12.7)

## 2025-03-10 PROCEDURE — 99999 PR PBB SHADOW E&M-EST. PATIENT-LVL I: CPT | Mod: PBBFAC,,,

## 2025-03-10 PROCEDURE — 80061 LIPID PANEL: CPT | Performed by: STUDENT IN AN ORGANIZED HEALTH CARE EDUCATION/TRAINING PROGRAM

## 2025-03-10 PROCEDURE — 97802 MEDICAL NUTRITION INDIV IN: CPT | Mod: S$GLB,,, | Performed by: DIETITIAN, REGISTERED

## 2025-03-10 PROCEDURE — 1160F RVW MEDS BY RX/DR IN RCRD: CPT | Mod: CPTII,S$GLB,, | Performed by: STUDENT IN AN ORGANIZED HEALTH CARE EDUCATION/TRAINING PROGRAM

## 2025-03-10 PROCEDURE — 1159F MED LIST DOCD IN RCRD: CPT | Mod: CPTII,S$GLB,, | Performed by: STUDENT IN AN ORGANIZED HEALTH CARE EDUCATION/TRAINING PROGRAM

## 2025-03-10 PROCEDURE — 93005 ELECTROCARDIOGRAM TRACING: CPT | Mod: S$GLB,,, | Performed by: STUDENT IN AN ORGANIZED HEALTH CARE EDUCATION/TRAINING PROGRAM

## 2025-03-10 PROCEDURE — 3008F BODY MASS INDEX DOCD: CPT | Mod: CPTII,S$GLB,, | Performed by: STUDENT IN AN ORGANIZED HEALTH CARE EDUCATION/TRAINING PROGRAM

## 2025-03-10 PROCEDURE — 99999 PR PBB SHADOW E&M-EST. PATIENT-LVL III: CPT | Mod: PBBFAC,,, | Performed by: STUDENT IN AN ORGANIZED HEALTH CARE EDUCATION/TRAINING PROGRAM

## 2025-03-10 PROCEDURE — 84443 ASSAY THYROID STIM HORMONE: CPT | Performed by: STUDENT IN AN ORGANIZED HEALTH CARE EDUCATION/TRAINING PROGRAM

## 2025-03-10 PROCEDURE — 3074F SYST BP LT 130 MM HG: CPT | Mod: CPTII,S$GLB,, | Performed by: STUDENT IN AN ORGANIZED HEALTH CARE EDUCATION/TRAINING PROGRAM

## 2025-03-10 PROCEDURE — 85027 COMPLETE CBC AUTOMATED: CPT | Performed by: STUDENT IN AN ORGANIZED HEALTH CARE EDUCATION/TRAINING PROGRAM

## 2025-03-10 PROCEDURE — 3044F HG A1C LEVEL LT 7.0%: CPT | Mod: CPTII,S$GLB,, | Performed by: STUDENT IN AN ORGANIZED HEALTH CARE EDUCATION/TRAINING PROGRAM

## 2025-03-10 PROCEDURE — 80053 COMPREHEN METABOLIC PANEL: CPT | Performed by: STUDENT IN AN ORGANIZED HEALTH CARE EDUCATION/TRAINING PROGRAM

## 2025-03-10 PROCEDURE — 83036 HEMOGLOBIN GLYCOSYLATED A1C: CPT | Performed by: STUDENT IN AN ORGANIZED HEALTH CARE EDUCATION/TRAINING PROGRAM

## 2025-03-10 PROCEDURE — 99395 PREV VISIT EST AGE 18-39: CPT | Mod: 25,S$GLB,, | Performed by: STUDENT IN AN ORGANIZED HEALTH CARE EDUCATION/TRAINING PROGRAM

## 2025-03-10 PROCEDURE — 93010 ELECTROCARDIOGRAM REPORT: CPT | Mod: S$GLB,,, | Performed by: INTERNAL MEDICINE

## 2025-03-10 PROCEDURE — 97750 PHYSICAL PERFORMANCE TEST: CPT | Mod: S$GLB,,, | Performed by: INTERNAL MEDICINE

## 2025-03-10 PROCEDURE — 3078F DIAST BP <80 MM HG: CPT | Mod: CPTII,S$GLB,, | Performed by: STUDENT IN AN ORGANIZED HEALTH CARE EDUCATION/TRAINING PROGRAM

## 2025-03-10 NOTE — PATIENT INSTRUCTIONS
Your results today so far have been normal. If any pending results are abnormal, I will let you know via [facility] or phone call.      Consider HPV vaccine & COVID booster. You may return to [facility] or have completed at an outside pharmacy at any time.

## 2025-03-10 NOTE — LETTER
March 11, 2025    Vivek Fletcher  [address]  [facility] [address]             [facility]  [address]  [facility] [address]  Phone: [phone]  Fax: [phone] Dear Mr. Fletcher:      It was a pleasure seeing you in clinic for your Executive Health exam on 3/10/2025. Enclosed is a copy of the clinic note detailing the history, physical exam findings, laboratory studies, and recommendations at that time. Thank you for allowing me to participate in your medical care.        If you have any questions or concerns, please don't hesitate to call.      Sincerely,        Yamile Ivy MD

## 2025-03-10 NOTE — PROGRESS NOTES
[facility] EXECUTIVE HEALTH EXAM      CHIEF COMPLAINT: Executive health exam      HISTORY OF PRESENT ILLNESS: Vivek Fletcher is a 24 y.o. male who presents here today for an executive health examination. Patient is an employee of [employer]. Currently living in [place] but planning a move to [place]. He is not sure if [employer] will continue to offer healthcare through [facility] . Patient denies any acute concerns today.      PAST MEDICAL HISTORY:  No past medical history on file.      MEDICATIONS:  Current Medications[1]      PAST SURGICAL HISTORY:  No past surgical history on file.      SOCIAL HISTORY:  Social History[2]      ALLERGIES:  Review of patient's allergies indicates:  No Known Allergies      FAMILY HISTORY:  Family History   Problem Relation Name Age of Onset    Drug abuse Mother Sindi Fletcher     No Known Problems Father      No Known Problems Sister      No Known Problems Brother      No Known Problems Brother      No Known Problems Maternal Grandmother      Alcohol abuse Maternal Grandfather Yamil Albright     No Known Problems Paternal Grandmother      No Known Problems Paternal Grandfather           HEALTH MAINTENANCE:     IMMUNIZATIONS:                                                              Influenza:  completed  COVID: yes, booster declined  RSV: not indicated  Tdap: last in 2023, updated not indicated  HPV: declined  Shingles: not indicated  Pneumonia: not indicated    SCREENINGS        Prostate cancer: not indicated  Colon cancer: not indicated  Lung cancer: not indicated  HIV:  completed  Hepatitis C:  completed  STI: not indicated  Diabetes: ordered  Lipids: ordered  AAA: not indicated  Bone density: not indicated  Anxiety and depression: previously saw therapist for anxiety now feels anxiety is well controlled with lifestyle changes and own coping skills he is practicing    LIFESTYLE         Exercise: yes, 4-5 x week, cardio but mostly strength   Diet: healthy  Substance use: as  above  Employment:  at [employer]   Family & living situation: Lives in [place]     INTERVENTIONS        Statin: no  Aspirin: no        PHYSICAL EXAM:    /72 (BP Location: Left arm, Patient Position: Sitting)   Pulse 76   Wt 71.2 kg (156 lb 15.5 oz)   SpO2 98%   BMI 21.89 kg/m²     Physical Exam  Vitals and nursing note reviewed.   Constitutional:       General: He is not in acute distress.     Appearance: Normal appearance. He is not ill-appearing, toxic-appearing or diaphoretic.   HENT:      Head: Normocephalic and atraumatic.      Nose: Nose normal.   Eyes:      Extraocular Movements: Extraocular movements intact.      Conjunctiva/sclera: Conjunctivae normal.      Pupils: Pupils are equal, round, and reactive to light.   Cardiovascular:      Rate and Rhythm: Normal rate and regular rhythm.      Heart sounds: Normal heart sounds. No murmur heard.  Pulmonary:      Effort: Pulmonary effort is normal. No respiratory distress.      Breath sounds: Normal breath sounds. No wheezing.   Musculoskeletal:         General: No deformity. Normal range of motion.   Skin:     Findings: No lesion or rash.   Neurological:      General: No focal deficit present.      Mental Status: He is alert.      Motor: No weakness.      Gait: Gait normal.   Psychiatric:         Mood and Affect: Mood normal.         Behavior: Behavior normal.         Thought Content: Thought content normal.         Judgment: Judgment normal.                LAB REVIEW:    Lab Results   Component Value Date     03/10/2025    K 3.5 03/10/2025     03/10/2025    CO2 28 03/10/2025    BUN 20 03/10/2025    CREATININE 1.2 03/10/2025    CALCIUM 9.4 03/10/2025    ANIONGAP 8 03/10/2025    EGFRNORACEVR >60.0 03/10/2025       Lab Results   Component Value Date    ALT 18 03/10/2025    AST 18 03/10/2025    ALKPHOS 66 03/10/2025    BILITOT 1.0 03/10/2025       Lab Results   Component Value Date    WBC 7.79 03/10/2025    HGB 15.1 03/10/2025    HCT  46.0 03/10/2025    MCV 97 03/10/2025     03/10/2025       Lab Results   Component Value Date    TSH 1.550 03/10/2025       Lab Results   Component Value Date    HGBA1C 4.7 03/10/2025       Cholesterol   Date Value Ref Range Status   03/10/2025 166 120 - 199 mg/dL Final     Comment:     The National Cholesterol Education Program (NCEP) has set the  following guidelines (reference ranges) for Cholesterol:  Optimal.....................<200 mg/dL  Borderline High.............200-239 mg/dL  High........................> or = 240 mg/dL       HDL   Date Value Ref Range Status   03/10/2025 69 40 - 75 mg/dL Final     Comment:     The National Cholesterol Education Program (NCEP) has set the  following guidelines (reference values) for HDL Cholesterol:  Low...............<40 mg/dL  Optimal...........>60 mg/dL       LDL Cholesterol   Date Value Ref Range Status   03/10/2025 75.0 63.0 - 159.0 mg/dL Final     Comment:     The National Cholesterol Education Program (NCEP) has set the  following guidelines (reference values) for LDL Cholesterol:  Optimal.......................<130 mg/dL  Borderline High...............130-159 mg/dL  High..........................160-189 mg/dL  Very High.....................>190 mg/dL       Triglycerides   Date Value Ref Range Status   03/10/2025 110 30 - 150 mg/dL Final     Comment:     The National Cholesterol Education Program (NCEP) has set the  following guidelines (reference values) for triglycerides:  Normal......................<150 mg/dL  Borderline High.............150-199 mg/dL  High........................200-499 mg/dL               IMAGING:    EKG    Results for orders placed or performed during the hospital encounter of 03/10/25   EKG 12-lead    Collection Time: 03/10/25  9:54 AM   Result Value Ref Range    QRS Duration 90 ms    [facility] QTC Calculation 432 ms    Narrative    Test Reason : Z00.00,    Vent. Rate :  84 BPM     Atrial Rate :  84 BPM     P-R Int : 138 ms          QRS Dur  :  90 ms      QT Int : 366 ms       P-R-T Axes :  77  95  49 degrees    QTcB Int : 432 ms    Normal sinus rhythm  Right atrial enlargement  Right axis deviation  Abnormal ECG  When compared with ECG of 28-Mar-2024 09:22,  The axis Shifted right  Confirmed by Anthony Bah ([id number]) on 3/10/2025 11:48:21 AM    Referred By: BUSHRA MCNULTY           Confirmed By: Anthony Bah               ASSESSMENT & PLAN:    1. Annual physical exam  Health screenings and immunizations due as below  History, physical exam findings, imaging results, and lab results are all acceptable with exception of what is detailed below      2. Abnormal EKG  Assessment & Plan:  EKG with right atrial enlargement (chronic) and right axis deviation (new today) otherwise NSR  Patient generally has no cardiac symptoms other than occasional pounding sensation in chest with high intensity physical activity   Discussed possible contribution of rib cage deformity such as scoliosis or pectus excavatum contributing to EKG findings and symptoms   If symptoms persist or worsen, or any other EKG changes occur, consider further cardiac evaluation       3. Anxiety  Assessment & Plan:  Well controlled with lifestyle changes and coping mechanisms learned in therapy, no further treatment at this time       4. Vaccination refused by patient  HPV & COVID booster declined today, recommend obtaining at any pharmacy when possible                Bushra Mcnulty MD  [facility]  03/10/2025                 [1]   Current Outpatient Medications:     FINASTERIDE ORAL, 5 mg., Disp: , Rfl:   [2]   Social History  Tobacco Use    Smoking status: Never    Smokeless tobacco: Never    Tobacco comments:     E-cigarette daily   Substance Use Topics    Alcohol use: Yes     Alcohol/week: 6.0 standard drinks of alcohol     Types: 6 Drinks containing 0.5 oz of alcohol per week    Drug use: Not Currently     Frequency: 2.0 times per week     Types: Marijuana     Comment: Very infrequently  2023 onward

## 2025-03-10 NOTE — PROGRESS NOTES
Pt. Has no significant cardiovascular or pulmonary history.    Physical Limitations:  Patient denied any limitations to physical activity.     Current exercise routine: Patient currently works out 3x/wk doing strength training and 1-2x/wk doing cardio, which is usually a 15 min run. He does walk up and down 12 flights of stairs to the gym every day he goes. He also walks to work which is about a half mile total. While his work is sedentary, he walks to and from lunch and will get up throughout the day from his desk.     Goals: Patient stated he would like to maintain current weight (within a few pounds) but work on building more muscle and cutting fat.     Notes:  Patient was friendly and engaged. Since last visit, he has been working on adding in more cardio and cut back to 3 days a week in gym. He's happy with current weight since last visit as well, when he was trying to gain. Patient asked questions and was receptive to all recommendations.      The fitness evaluation results are as follows:    D.O.S. 3/10/2025 3/28/2024   Height (in): 71 71   Weight (lbs): 154.2 147.9   BMI: 21.457486 20.477571   Body Fat (%): 13.40 11.70   Waist (cm): 75 74   RBP (mmHg): 100/60 110/80   RHR (bpm): 76 68    Strength Dominant (Lbs): 130 127    Strength Non Dominant (Lbs): 127 125   Push-up Assessment: 38 26   Curl-up Assessment: 44 36   Flexibility Testing (cm): 41 38   REE (kcals): 1677 1649       Age/gender stratified assessment:    Resting BP: Within Normal Limits   Body Fat %: Very Good   Waist Circumfernece: Very Low    Strength Dominant: 90th    Strength Non Dominant: 90th   Upper Body Endurance: Excellent   Abdominal Endurance: Above Average   Lower body Flexibiltiy: Excellent       Recommended fitness guidelines:    -150 minutes of moderate intensity aerobic exercise per week or 75 minutes of vigorous intensity aerobic exercise per week. Add days, time, or intensity to your current aerobic exercise  routine to reach the above guidelines.    -2 to 4 days per week of resistance training for each muscle group.      -Daily stretching with a hold of at least 30 seconds per muscle group.

## 2025-03-10 NOTE — PROGRESS NOTES
"Nutrition Assessment  Session Time:  45 minutes      Client name:  Vivek Fletcher  :  2000  Age:  24 y.o.  Gender:  male    Client states:  Very pleasant [employer] employee here for his annual [employer] physical.  Recalls previous nutrition consult and recommendations received, which he has since implemented.  Is comfortable with current weight, recalling intentional increase from 140# to 155#.  Is considering gaining a few additional pounds and is specifically focused on body composition, wishing to increase LBM and reduce body fat.  Over the past year, incorporated cardio into his P.A. routine, recalling current routine of 30-45 minutes weight training 3x/week and 30 minutes of moderate-high intensity cardio 2x/week.  Prefers to be efficient when exercising.  Regarding dietary habits, incorporated vegetables with meals, reduced ETOH intake, eliminated night time protein shake, and improved protein distribution as previously advised.  Finds that his energy and sleep are better overall as a result.  Also, reduced caffeine intake (via PO intake and supplements) to <500 mg/day.  Occasionally skips breakfast and thereafter, realizes impact on weight.  Finds that although he enjoys eating and enjoys food in general, it feels almost like a "chore" to eat at times.  May dine out with coworkers at lunch when in the office although walks to and from restaurant and is conscious of food selection.  Inquired about creatine supplement and alternative breakfast choices.  Expressed gratitude for  program and available services.  Plans to move to [place], [place] over the next year due to job assignment.    Anthropometrics  Height:  5' 11"     Weight:  154.2#  BMI:  21.5  % Body Fat:  13.4%    Clinical Signs/Symptoms  N/V/D:  None  Appetite:  good       No past medical history on file.    No past surgical history on file.    Medications    has a current medication list which includes the following prescription(s): " finasteride.    Vitamins, Minerals, and/or Supplements:  Creatine + pre-workout supplement     Food/Medication Interactions:  Reviewed     Food Allergies or Intolerances:  NKFA     Social History    Marital status:  Single  Employment:  [employer]    Social History     Tobacco Use    Smoking status: Never    Smokeless tobacco: Never    Tobacco comments:     E-cigarette daily   Substance Use Topics    Alcohol use: Yes     Alcohol/week: 6.0 standard drinks of alcohol     Types: 6 Drinks containing 0.5 oz of alcohol per week        Lab Reports   Sodium   Date Value Ref Range Status   03/10/2025 139 136 - 145 mmol/L Final     Potassium   Date Value Ref Range Status   03/10/2025 3.5 3.5 - 5.1 mmol/L Final     Chloride   Date Value Ref Range Status   03/10/2025 103 95 - 110 mmol/L Final     CO2   Date Value Ref Range Status   03/10/2025 28 23 - 29 mmol/L Final     Glucose   Date Value Ref Range Status   03/10/2025 85 70 - 110 mg/dL Final     BUN   Date Value Ref Range Status   03/10/2025 20 6 - 20 mg/dL Final     Creatinine   Date Value Ref Range Status   03/10/2025 1.2 0.5 - 1.4 mg/dL Final     Calcium   Date Value Ref Range Status   03/10/2025 9.4 8.7 - 10.5 mg/dL Final     Total Protein   Date Value Ref Range Status   03/10/2025 7.4 6.0 - 8.4 g/dL Final     Albumin   Date Value Ref Range Status   03/10/2025 4.2 3.5 - 5.2 g/dL Final     Total Bilirubin   Date Value Ref Range Status   03/10/2025 1.0 0.1 - 1.0 mg/dL Final     Comment:     For infants and newborns, interpretation of results should be based  on gestational age, weight and in agreement with clinical  observations.    Premature Infant recommended reference ranges:  Up to 24 hours.............<8.0 mg/dL  Up to 48 hours............<12.0 mg/dL  3-5 days..................<15.0 mg/dL  6-29 days.................<15.0 mg/dL       Alkaline Phosphatase   Date Value Ref Range Status   03/10/2025 66 40 - 150 U/L Final     AST   Date Value Ref Range Status   03/10/2025 18 10  - 40 U/L Final     ALT   Date Value Ref Range Status   03/10/2025 18 10 - 44 U/L Final     Anion Gap   Date Value Ref Range Status   03/10/2025 8 8 - 16 mmol/L Final      Lab Results   Component Value Date    WBC 7.79 03/10/2025    HGB 15.1 03/10/2025    HCT 46.0 03/10/2025    MCV 97 03/10/2025     03/10/2025        Lab Results   Component Value Date    CHOL 166 03/10/2025     Lab Results   Component Value Date    HDL 69 03/10/2025     Lab Results   Component Value Date    LDLCALC 75.0 03/10/2025     Lab Results   Component Value Date    TRIG 110 03/10/2025     Lab Results   Component Value Date    CHOLHDL 41.6 03/10/2025     Lab Results   Component Value Date    HGBA1C 4.7 03/10/2025     BP Readings from Last 1 Encounters:   03/10/25 124/72       Food History  Breakfast:  Skips/2 eggs + ~1/2 cup corn beef + hashbrowns/banana  Mid-morning Snack:  None  Lunch:  Mediterranean cuisine/restaurant chicken sandwich  Mid-afternoon Snack:  None  Dinner:  Chicken + rice + vegetables  Snack:  None  *Fluid intake:  Water, coffee, Diet Coke, Coke Zero, ETOH    Exercise History:  30 minutes moderate-high intensity cardio (treadmill) 2x/week + 30-45 minutes weight training 3x/week    Cultural/Spiritual/Personal Preferences:  None identified    Support System:  friends    State of Change:  Action    Barriers to Change:  None    Diagnosis    Other: No nutrition-related diagnosis at this time.      Intervention    RMR (Method:  InBody):  1677 kcal  Activity Factor:  1.4    BRIDGET:  2347 kcal    Goals:  1.  Reduce meal skipping x 25%  2.  Consider alternative breakfast choice of 1/2 avocado + 1-2 eggs + center cut johnson + whole grain toast  3.  Maintain active lifestyle as tolerated  4.  Maintain healthy eating habits and adequate protein distribution     Nutrition Education  The following education was provided to the patient:  Complimented patient on proactive role in health maintenance.  Complimented patient on dietary  compliance/modifications and resulting health improvements.  Complimented patient on physical activity efforts.  Discussed weight management.  Suggested dietary modifications based on current dietary behaviors and individual food preferences.  Discussed macronutrient distribution among meals and snacks, including CHO, protein, and fat recommendations and its importance/benefits.  Discussed physical activity guidelines and its associated benefits.  *Lab results were pending at time of consult and so, not discussed with patient.  Discussed recommended protein intake (may include but not limited to recommended food sources, benefits of adequate protein intake, importance of protein distribution, etc.)   Discussed recommended fiber intake and food sources of such.    Patient verbalized understanding of nutrition education and recommendations received.    Handouts Provided  Meal Planning Guide  Eat Fit Shopping List  Fueling Well On-The-Go    Monitoring/Evaluation    Monitor the following:  Weight  BMI  % Body Fat    Follow Up Plan:  Communication with referring healthcare provider is unnecessary at this time as patient presented as part of annual wellness exam.  However, will follow up with patient in 1-2 years.

## 2025-03-11 PROBLEM — R94.31 ABNORMAL EKG: Status: ACTIVE | Noted: 2024-03-28

## 2025-03-11 NOTE — ASSESSMENT & PLAN NOTE
EKG with right atrial enlargement (chronic) and right axis deviation (new today) otherwise NSR  Patient generally has no cardiac symptoms other than occasional pounding sensation in chest with high intensity physical activity   Discussed possible contribution of rib cage deformity such as scoliosis or pectus excavatum contributing to EKG findings and symptoms   If symptoms persist or worsen, or any other EKG changes occur, consider further cardiac evaluation

## 2025-03-11 NOTE — ASSESSMENT & PLAN NOTE
Well controlled with lifestyle changes and coping mechanisms learned in therapy, no further treatment at this time